# Patient Record
Sex: MALE | Race: WHITE | NOT HISPANIC OR LATINO
[De-identification: names, ages, dates, MRNs, and addresses within clinical notes are randomized per-mention and may not be internally consistent; named-entity substitution may affect disease eponyms.]

---

## 2018-08-30 ENCOUNTER — APPOINTMENT (OUTPATIENT)
Dept: UROLOGY | Facility: CLINIC | Age: 58
End: 2018-08-30

## 2022-06-15 ENCOUNTER — APPOINTMENT (OUTPATIENT)
Dept: ORTHOPEDIC SURGERY | Facility: CLINIC | Age: 62
End: 2022-06-15
Payer: SELF-PAY

## 2022-06-15 VITALS — HEIGHT: 70 IN | WEIGHT: 180 LBS | BODY MASS INDEX: 25.77 KG/M2

## 2022-06-15 DIAGNOSIS — Z78.9 OTHER SPECIFIED HEALTH STATUS: ICD-10-CM

## 2022-06-15 PROCEDURE — 99213 OFFICE O/P EST LOW 20 MIN: CPT

## 2022-06-15 PROCEDURE — 73610 X-RAY EXAM OF ANKLE: CPT | Mod: RT

## 2022-06-15 NOTE — IMAGING
[Right] : right ankle [Weight -] : weightbearing [Tibio-talar degenerative changes] : Tibio-talar degenerative changes [FreeTextEntry9] : syndesmotic calcification

## 2022-06-15 NOTE — PHYSICAL EXAM
[Right] : right foot and ankle [Moderate] : moderate diffused ankle swelling [5___] : plantar flexion 5[unfilled]/5 [2+] : dorsalis pedis pulse: 2+ [] : no calf tenderness [de-identified] : plantar flexion 30 degrees [TWNoteComboBox7] : dorsiflexion 10 degrees

## 2022-06-15 NOTE — HISTORY OF PRESENT ILLNESS
[8] : 8 [Localized] : localized [Stabbing] : stabbing [Throbbing] : throbbing [Stairs] : stairs [Retired] : Work status: retired [de-identified] : Patient Complaint - Televisit: R ankle pain. WC fell off truck 12/31/2008. Had ORIF by Dr. Joshua on 1/6/09 follow by\par Dr. Blackburn with lianna 2010. Pain w/activities\par 10/28/20 Televisit R ankle\par 5/4/21 Televisit R ankle pain persists Tylenol HEP/PT\par 9/30/21 Televisit R ankle pain and weather changes. Sees Dr. Cooper for spine/pain\par 10/20/21: f/u R ankle. Stable. Here to check swelling. Pain is mild. 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 Vieira

## 2022-06-15 NOTE — DISCUSSION/SUMMARY
[de-identified] : Discussed bracing/injections.  Candidate for TAA.\par \par \par Entered by Nena WOLF acting as a scribe.\par Instructions: Dr. Vieira- The documentation recorded by the scribe accurately reflects the service I personally performed and the decisions made by me.\par \par

## 2023-01-11 ENCOUNTER — APPOINTMENT (OUTPATIENT)
Dept: ORTHOPEDIC SURGERY | Facility: CLINIC | Age: 63
End: 2023-01-11
Payer: SELF-PAY

## 2023-01-11 VITALS — WEIGHT: 180 LBS | HEIGHT: 70 IN | BODY MASS INDEX: 25.77 KG/M2

## 2023-01-11 PROCEDURE — 99213 OFFICE O/P EST LOW 20 MIN: CPT

## 2023-01-11 NOTE — REVIEW OF SYSTEMS
[Joint Pain] : joint pain [Muscle Weakness] : muscle weakness [Negative] : Heme/Lymph [Joint Swelling] : no joint swelling

## 2023-01-11 NOTE — PHYSICAL EXAM
[Right] : right foot and ankle [Moderate] : moderate diffused ankle swelling [5___] : plantar flexion 5[unfilled]/5 [2+] : dorsalis pedis pulse: 2+ [] : no calf tenderness [de-identified] : plantar flexion 30 degrees [TWNoteComboBox7] : dorsiflexion 15 degrees

## 2023-01-11 NOTE — HISTORY OF PRESENT ILLNESS
[8] : 8 [Throbbing] : throbbing [Stairs] : stairs [Retired] : Work status: retired [Radiating] : radiating [Meds] : meds [Walking] : walking [Physical therapy] : physical therapy [de-identified] : Patient Complaint - Televisit: R ankle pain. WC fell off truck 12/31/2008. Had ORIF by Dr. Joshua on 1/6/09 follow by\par Dr. Blackburn with lianna 2010. Pain w/activities\par 10/28/20 Televisit R ankle\par 5/4/21 Televisit R ankle pain persists Tylenol HEP/PT\par 9/30/21 Televisit R ankle pain and weather changes. Sees Dr. Cooper for spine/pain\par 10/20/21: f/u R ankle. Stable. Here to check swelling. Pain is mild. 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 Pain persists.  [] : no [FreeTextEntry1] : rt ankle [FreeTextEntry7] : foot pain

## 2023-01-11 NOTE — DISCUSSION/SUMMARY
[de-identified] : Discussed bracing/injections.  Candidate for TAA.\par He is not ready for surgery at this time. \par \par \par Entered by Nena WOLF acting as a scribe.\par Instructions: Dr. Vieira- The documentation recorded by the scribe accurately reflects the service I personally performed and the decisions made by me.\par \par

## 2023-08-16 ENCOUNTER — APPOINTMENT (OUTPATIENT)
Dept: ORTHOPEDIC SURGERY | Facility: CLINIC | Age: 63
End: 2023-08-16
Payer: SELF-PAY

## 2023-08-16 VITALS — BODY MASS INDEX: 25.77 KG/M2 | HEIGHT: 70 IN | WEIGHT: 180 LBS

## 2023-08-16 PROCEDURE — 99213 OFFICE O/P EST LOW 20 MIN: CPT

## 2023-08-16 NOTE — HISTORY OF PRESENT ILLNESS
[8] : 8 [Burning] : burning [Localized] : localized [Shooting] : shooting [Stabbing] : stabbing [Meds] : meds [Sitting] : sitting [Standing] : standing [Walking] : walking [Stairs] : stairs [Physical therapy] : physical therapy [Retired] : Work status: retired [de-identified] : Patient Complaint - Televisit: R ankle pain. WC fell off truck 12/31/2008. Had ORIF by Dr. Joshua on 1/6/09 follow by Dr. Blackburn with lianna 2010. Pain w/activities 10/28/20 Televisit R ankle 5/4/21 Televisit R ankle pain persists Tylenol HEP/PT 9/30/21 Televisit R ankle pain and weather changes. Sees Dr. Cooper for spine/pain 10/20/21: f/u R ankle. Stable. Here to check swelling. Pain is mild. 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 6/15/22  f/u R ankle  pain and swelling  ice helpful 1/11/23: f/u R ankle. Pain persists.  8/16/23: f/u R ankle. Pain persists. considering surgery [] : no [FreeTextEntry1] : rt ankle

## 2023-08-16 NOTE — PHYSICAL EXAM
[Right] : right foot and ankle [Moderate] : moderate diffused ankle swelling [5___] : plantar flexion 5[unfilled]/5 [2+] : dorsalis pedis pulse: 2+ [] : no calf tenderness [de-identified] : plantar flexion 30 degrees [TWNoteComboBox7] : dorsiflexion 10 degrees

## 2023-08-16 NOTE — DISCUSSION/SUMMARY
[de-identified] : Discussed bracing/injections.  Candidate for TAA. Will refer to Dr. Castelan for further evaluation and discussion of TAA. Smoking cessation discussed.   Entered by Nena WOLF acting as a scribe. Instructions: Dr. Vieira- The documentation recorded by the scribe accurately reflects the service I personally performed and the decisions made by me.

## 2023-12-07 ENCOUNTER — APPOINTMENT (OUTPATIENT)
Dept: COLORECTAL SURGERY | Facility: CLINIC | Age: 63
End: 2023-12-07
Payer: MEDICARE

## 2023-12-07 VITALS
HEIGHT: 70 IN | OXYGEN SATURATION: 96 % | WEIGHT: 180 LBS | BODY MASS INDEX: 25.77 KG/M2 | SYSTOLIC BLOOD PRESSURE: 136 MMHG | HEART RATE: 94 BPM | DIASTOLIC BLOOD PRESSURE: 81 MMHG

## 2023-12-07 DIAGNOSIS — K62.5 HEMORRHAGE OF ANUS AND RECTUM: ICD-10-CM

## 2023-12-07 DIAGNOSIS — K64.8 OTHER HEMORRHOIDS: ICD-10-CM

## 2023-12-07 PROCEDURE — 99204 OFFICE O/P NEW MOD 45 MIN: CPT | Mod: 25

## 2023-12-07 PROCEDURE — 46221 LIGATION OF HEMORRHOID(S): CPT

## 2024-01-12 ENCOUNTER — APPOINTMENT (OUTPATIENT)
Dept: COLORECTAL SURGERY | Facility: CLINIC | Age: 64
End: 2024-01-12

## 2024-02-21 ENCOUNTER — APPOINTMENT (OUTPATIENT)
Dept: ORTHOPEDIC SURGERY | Facility: CLINIC | Age: 64
End: 2024-02-21

## 2024-03-20 ENCOUNTER — APPOINTMENT (OUTPATIENT)
Dept: ORTHOPEDIC SURGERY | Facility: CLINIC | Age: 64
End: 2024-03-20
Payer: SELF-PAY

## 2024-03-20 VITALS — HEIGHT: 70 IN | BODY MASS INDEX: 25.77 KG/M2 | WEIGHT: 180 LBS

## 2024-03-20 DIAGNOSIS — M19.171 POST-TRAUMATIC OSTEOARTHRITIS, RIGHT ANKLE AND FOOT: ICD-10-CM

## 2024-03-20 PROCEDURE — 99213 OFFICE O/P EST LOW 20 MIN: CPT

## 2024-03-20 NOTE — DISCUSSION/SUMMARY
[de-identified] : Discussed bracing/injections.  Candidate for TAA. Will refer to Dr. Castelan for further evaluation and discussion of TAA. Smoking cessation discussed.   Entered by Nena WOLF acting as a scribe. Instructions: Dr. Vieira- The documentation recorded by the scribe accurately reflects the service I personally performed and the decisions made by me.

## 2024-03-20 NOTE — PHYSICAL EXAM
[Right] : right foot and ankle [Moderate] : moderate diffused ankle swelling [5___] : plantar flexion 5[unfilled]/5 [2+] : dorsalis pedis pulse: 2+ [] : no calf tenderness [TWNoteComboBox7] : dorsiflexion 10 degrees [de-identified] : plantar flexion 30 degrees

## 2024-03-20 NOTE — HISTORY OF PRESENT ILLNESS
[8] : 8 [Localized] : localized [Burning] : burning [Shooting] : shooting [Stabbing] : stabbing [Meds] : meds [Standing] : standing [Walking] : walking [Stairs] : stairs [Physical therapy] : physical therapy [Retired] : Work status: retired [Throbbing] : throbbing [de-identified] : Patient Complaint - Televisit: R ankle pain. WC fell off truck 12/31/2008. Had ORIF by Dr. Joshua on 1/6/09 follow by Dr. Blackburn with lianna 2010. Pain w/activities 10/28/20 Televisit R ankle 5/4/21 Televisit R ankle pain persists Tylenol HEP/PT 9/30/21 Televisit R ankle pain and weather changes. Sees Dr. Cooper for spine/pain 10/20/21: f/u R ankle. Stable. Here to check swelling. Pain is mild. 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 6/15/22  f/u R ankle  pain and swelling  ice helpful 1/11/23: f/u R ankle. Pain persists.  8/16/23: f/u R ankle. Pain persists. considering surgery 3/20/24: f/u R ankle. Pain persists. Considering surgery. He is working on quitting smoking.  [] : no [FreeTextEntry1] : rt ankle [de-identified] : patient would like to discuss surgery

## 2024-03-20 NOTE — REVIEW OF SYSTEMS
[Joint Pain] : joint pain [Muscle Weakness] : muscle weakness [Joint Swelling] : joint swelling [Negative] : Heme/Lymph

## 2024-09-25 ENCOUNTER — APPOINTMENT (OUTPATIENT)
Dept: ORTHOPEDIC SURGERY | Facility: CLINIC | Age: 64
End: 2024-09-25
Payer: MEDICARE

## 2024-09-25 VITALS — HEIGHT: 70 IN | BODY MASS INDEX: 25.77 KG/M2 | WEIGHT: 180 LBS

## 2024-09-25 DIAGNOSIS — M19.171 POST-TRAUMATIC OSTEOARTHRITIS, RIGHT ANKLE AND FOOT: ICD-10-CM

## 2024-09-25 PROCEDURE — 99212 OFFICE O/P EST SF 10 MIN: CPT

## 2024-09-25 NOTE — REVIEW OF SYSTEMS
[Joint Pain] : joint pain [Joint Swelling] : joint swelling [Muscle Weakness] : muscle weakness [Negative] : Heme/Lymph [Joint Stiffness] : joint stiffness

## 2024-09-25 NOTE — PHYSICAL EXAM
[Right] : right foot and ankle [Moderate] : moderate diffused ankle swelling [5___] : plantar flexion 5[unfilled]/5 [2+] : dorsalis pedis pulse: 2+ [] : no calf tenderness [TWNoteComboBox7] : dorsiflexion 10 degrees [de-identified] : plantar flexion 30 degrees

## 2024-09-25 NOTE — HISTORY OF PRESENT ILLNESS
[8] : 8 [Burning] : burning [Localized] : localized [Shooting] : shooting [Stabbing] : stabbing [Throbbing] : throbbing [Meds] : meds [Standing] : standing [Walking] : walking [Stairs] : stairs [Physical therapy] : physical therapy [Retired] : Work status: retired [Rest] : rest [de-identified] : Patient Complaint - Televisit: R ankle pain. WC fell off truck 12/31/2008. Had ORIF by Dr. Joshua on 1/6/09 follow by Dr. Blackburn with lianna 2010. Pain w/activities 10/28/20 Televisit R ankle 5/4/21 Televisit R ankle pain persists Tylenol HEP/PT 9/30/21 Televisit R ankle pain and weather changes. Sees Dr. Cooper for spine/pain 10/20/21: f/u R ankle. Stable. Here to check swelling. Pain is mild. 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 6/15/22  f/u R ankle  pain and swelling  ice helpful 1/11/23: f/u R ankle. Pain persists.  8/16/23: f/u R ankle. Pain persists. considering surgery 3/20/24: f/u R ankle. Pain persists. Considering surgery. He is working on quitting smoking.  9/25/24  f/u R ankle  Uses elastic wrap  Stopped smoling [] : no [FreeTextEntry1] : rt ankle

## 2024-12-19 ENCOUNTER — APPOINTMENT (OUTPATIENT)
Dept: COLORECTAL SURGERY | Facility: CLINIC | Age: 64
End: 2024-12-19
Payer: MEDICARE

## 2024-12-19 DIAGNOSIS — K62.5 HEMORRHAGE OF ANUS AND RECTUM: ICD-10-CM

## 2024-12-19 DIAGNOSIS — K64.8 OTHER HEMORRHOIDS: ICD-10-CM

## 2024-12-19 PROCEDURE — 99214 OFFICE O/P EST MOD 30 MIN: CPT | Mod: 25

## 2024-12-19 PROCEDURE — 46221 LIGATION OF HEMORRHOID(S): CPT

## 2025-02-05 ENCOUNTER — APPOINTMENT (OUTPATIENT)
Dept: COLORECTAL SURGERY | Facility: CLINIC | Age: 65
End: 2025-02-05
Payer: MEDICARE

## 2025-02-05 VITALS
BODY MASS INDEX: 25.77 KG/M2 | WEIGHT: 180 LBS | RESPIRATION RATE: 16 BRPM | SYSTOLIC BLOOD PRESSURE: 133 MMHG | HEIGHT: 70 IN | DIASTOLIC BLOOD PRESSURE: 86 MMHG | HEART RATE: 60 BPM

## 2025-02-05 DIAGNOSIS — K64.8 OTHER HEMORRHOIDS: ICD-10-CM

## 2025-02-05 DIAGNOSIS — K62.5 HEMORRHAGE OF ANUS AND RECTUM: ICD-10-CM

## 2025-02-05 PROCEDURE — 46221 LIGATION OF HEMORRHOID(S): CPT

## 2025-02-05 PROCEDURE — 99214 OFFICE O/P EST MOD 30 MIN: CPT | Mod: 25

## 2025-02-05 RX ORDER — HYDROCORTISONE 25 MG/G
2.5 CREAM TOPICAL
Qty: 30 | Refills: 3 | Status: ACTIVE | COMMUNITY
Start: 2025-02-05 | End: 1900-01-01

## 2025-02-05 RX ORDER — HYDROCORTISONE ACETATE 25 MG/1
25 SUPPOSITORY RECTAL
Qty: 10 | Refills: 3 | Status: ACTIVE | COMMUNITY
Start: 2025-02-05 | End: 1900-01-01

## 2025-03-05 ENCOUNTER — APPOINTMENT (OUTPATIENT)
Dept: COLORECTAL SURGERY | Facility: CLINIC | Age: 65
End: 2025-03-05
Payer: MEDICARE

## 2025-03-05 DIAGNOSIS — K62.5 HEMORRHAGE OF ANUS AND RECTUM: ICD-10-CM

## 2025-03-05 DIAGNOSIS — K64.8 OTHER HEMORRHOIDS: ICD-10-CM

## 2025-03-05 PROCEDURE — 99214 OFFICE O/P EST MOD 30 MIN: CPT | Mod: 25

## 2025-03-05 PROCEDURE — 46221 LIGATION OF HEMORRHOID(S): CPT

## 2025-03-26 ENCOUNTER — APPOINTMENT (OUTPATIENT)
Dept: ORTHOPEDIC SURGERY | Facility: CLINIC | Age: 65
End: 2025-03-26

## 2025-04-01 ENCOUNTER — APPOINTMENT (OUTPATIENT)
Dept: COLORECTAL SURGERY | Facility: CLINIC | Age: 65
End: 2025-04-01

## 2025-05-14 ENCOUNTER — APPOINTMENT (OUTPATIENT)
Dept: ORTHOPEDIC SURGERY | Facility: CLINIC | Age: 65
End: 2025-05-14
Payer: MEDICARE

## 2025-05-14 VITALS — HEIGHT: 70 IN | WEIGHT: 180 LBS | BODY MASS INDEX: 25.77 KG/M2

## 2025-05-14 DIAGNOSIS — M19.171 POST-TRAUMATIC OSTEOARTHRITIS, RIGHT ANKLE AND FOOT: ICD-10-CM

## 2025-05-14 PROCEDURE — 99213 OFFICE O/P EST LOW 20 MIN: CPT
